# Patient Record
Sex: FEMALE | Race: WHITE | NOT HISPANIC OR LATINO | URBAN - METROPOLITAN AREA
[De-identification: names, ages, dates, MRNs, and addresses within clinical notes are randomized per-mention and may not be internally consistent; named-entity substitution may affect disease eponyms.]

---

## 2019-04-08 PROBLEM — 39621005 DISORDER OF GALLBLADDER: Status: ACTIVE | Noted: 2019-04-08

## 2019-07-24 PROBLEM — 70153002 HEMORRHOIDS: Status: ACTIVE | Noted: 2019-07-24

## 2019-08-07 PROBLEM — 75694006 PANCREATITIS: Status: ACTIVE | Noted: 2019-08-07

## 2021-07-12 ENCOUNTER — OFFICE VISIT (OUTPATIENT)
Dept: URBAN - METROPOLITAN AREA CLINIC 13 | Facility: CLINIC | Age: 66
End: 2021-07-12

## 2021-08-28 ENCOUNTER — TELEPHONE ENCOUNTER (OUTPATIENT)
Dept: URBAN - METROPOLITAN AREA CLINIC 13 | Facility: CLINIC | Age: 66
End: 2021-08-28

## 2021-08-28 RX ORDER — POLYETHYLENE GLYCOL 3350, SODIUM SULFATE, SODIUM CHLORIDE, POTASSIUM CHLORIDE, ASCORBIC ACID, SODIUM ASCORBATE 140-9-5.2G
KIT ORAL
OUTPATIENT
Start: 2019-07-24 | End: 2021-07-12

## 2021-08-29 ENCOUNTER — TELEPHONE ENCOUNTER (OUTPATIENT)
Dept: URBAN - METROPOLITAN AREA CLINIC 13 | Facility: CLINIC | Age: 66
End: 2021-08-29

## 2021-08-29 RX ORDER — ACETAMINOPHEN 500 MG/1
TABLET, FILM COATED ORAL
Status: ACTIVE | COMMUNITY

## 2021-08-29 RX ORDER — CETIRIZINE HYDROCHLORIDE 10 MG/1
TABLET, FILM COATED ORAL
Status: ACTIVE | COMMUNITY

## 2021-09-15 ENCOUNTER — OFFICE VISIT (OUTPATIENT)
Dept: URBAN - METROPOLITAN AREA MEDICAL CENTER 35 | Facility: MEDICAL CENTER | Age: 66
End: 2021-09-15

## 2021-11-10 ENCOUNTER — OFFICE VISIT (OUTPATIENT)
Dept: URBAN - METROPOLITAN AREA MEDICAL CENTER 35 | Facility: MEDICAL CENTER | Age: 66
End: 2021-11-10

## 2022-10-25 ENCOUNTER — OFFICE VISIT (OUTPATIENT)
Dept: URBAN - METROPOLITAN AREA CLINIC 48 | Facility: CLINIC | Age: 67
End: 2022-10-25

## 2022-10-25 RX ORDER — OMEPRAZOLE 10 MG/1
TAKE 1 CAPSULE (10 MG TOTAL) BY MOUTH IN THE MORNING CAPSULE, DELAYED RELEASE ORAL
Qty: 30 EACH | Refills: 0 | Status: ACTIVE | COMMUNITY

## 2022-10-25 RX ORDER — SUCRALFATE 1 G/1
TAKE 1 TABLET (1 G TOTAL) BY MOUTH 4 (FOUR) TIMES DAILY BEFORE MEALS AND NIGHTLY FOR 14 DAYS TABLET ORAL
Qty: 56 EACH | Refills: 0 | Status: ACTIVE | COMMUNITY

## 2022-11-21 ENCOUNTER — OFFICE VISIT (OUTPATIENT)
Dept: URBAN - METROPOLITAN AREA CLINIC 48 | Facility: CLINIC | Age: 67
End: 2022-11-21
Payer: MEDICARE

## 2022-11-21 ENCOUNTER — LAB OUTSIDE AN ENCOUNTER (OUTPATIENT)
Dept: URBAN - METROPOLITAN AREA CLINIC 48 | Facility: CLINIC | Age: 67
End: 2022-11-21

## 2022-11-21 ENCOUNTER — DASHBOARD ENCOUNTERS (OUTPATIENT)
Age: 67
End: 2022-11-21

## 2022-11-21 VITALS
DIASTOLIC BLOOD PRESSURE: 76 MMHG | OXYGEN SATURATION: 93 % | HEART RATE: 86 BPM | SYSTOLIC BLOOD PRESSURE: 143 MMHG | TEMPERATURE: 97.5 F | WEIGHT: 249.4 LBS | BODY MASS INDEX: 42.58 KG/M2 | HEIGHT: 64 IN

## 2022-11-21 DIAGNOSIS — R19.04 LEFT LOWER QUADRANT ABDOMINAL MASS: ICD-10-CM

## 2022-11-21 DIAGNOSIS — Z86.010 PERSONAL HISTORY OF COLONIC POLYPS: ICD-10-CM

## 2022-11-21 DIAGNOSIS — R10.84 ABDOMINAL CRAMPING, GENERALIZED: ICD-10-CM

## 2022-11-21 DIAGNOSIS — R63.4 UNINTENTIONAL WEIGHT LOSS: ICD-10-CM

## 2022-11-21 DIAGNOSIS — R19.4 CHANGE IN BOWEL HABIT: ICD-10-CM

## 2022-11-21 PROBLEM — 129851009: Status: ACTIVE | Noted: 2022-11-21

## 2022-11-21 PROBLEM — 827121001: Status: ACTIVE | Noted: 2022-11-21

## 2022-11-21 PROBLEM — 285388000: Status: ACTIVE | Noted: 2022-11-21

## 2022-11-21 PROBLEM — 448765001: Status: ACTIVE | Noted: 2022-11-21

## 2022-11-21 PROBLEM — 724556004: Status: ACTIVE | Noted: 2022-11-21

## 2022-11-21 PROCEDURE — 99204 OFFICE O/P NEW MOD 45 MIN: CPT | Performed by: NURSE PRACTITIONER

## 2022-11-21 RX ORDER — CHOLESTYRAMINE 4 G/9G
1 PACKET MIXED WITH WATER OR NON-CARBONATED DRINK. TAKE 2 HOURS AWAY FROM OTHER MEDICATION POWDER, FOR SUSPENSION ORAL ONCE A DAY
Qty: 60 | Refills: 6 | OUTPATIENT

## 2022-11-21 RX ORDER — OMEPRAZOLE 10 MG/1
TAKE 1 CAPSULE (10 MG TOTAL) BY MOUTH IN THE MORNING CAPSULE, DELAYED RELEASE ORAL
Qty: 30 EACH | Refills: 0 | Status: ACTIVE | COMMUNITY

## 2022-11-21 RX ORDER — CETIRIZINE HYDROCHLORIDE 10 MG/1
TABLET, FILM COATED ORAL
Status: ACTIVE | COMMUNITY

## 2022-11-21 RX ORDER — COLESTIPOL HYDROCHLORIDE 1 G/1
2 TABLETS. TAKE 2 HOURS AWAY FROM OTHER MEDICATIONS TABLET, FILM COATED ORAL ONCE A DAY
Qty: 60 | Refills: 6 | OUTPATIENT

## 2022-11-21 RX ORDER — SUCRALFATE 1 G/1
TAKE 1 TABLET (1 G TOTAL) BY MOUTH 4 (FOUR) TIMES DAILY BEFORE MEALS AND NIGHTLY FOR 14 DAYS TABLET ORAL
Qty: 56 EACH | Refills: 0 | Status: ACTIVE | COMMUNITY

## 2022-11-21 RX ORDER — ACETAMINOPHEN 500 MG/1
TABLET, FILM COATED ORAL
Status: ACTIVE | COMMUNITY

## 2022-11-21 NOTE — PHYSICAL EXAM SKIN:
no rashes , no suspicious lesions ,erythema to lower extremities with quarter size open wound with exudate, no jaundice present , good turgor , no masses , no tenderness on palpation

## 2022-11-21 NOTE — HPI-TODAY'S VISIT:
67 year old female presents for evaluation of diarrhea and abdominal masses. 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 Bowel movements are 4-5 times a day of loose, oily stool with occasional mucus. Denies blood in stool. 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 The patient has a large left lower abdomen mass and a right mid abdominal mass.  Reports 60 lb weight loss unintentionally in the last year. She has a has hx of BRENDA and used to take iron but has not been on it recently. CBC 6/2021 was unremarkable.

## 2022-11-21 NOTE — PHYSICAL EXAM CONSTITUTIONAL:
well developed, well nourished obese, in no acute distress , ambulating without difficulty using cane , normal communication ability

## 2022-11-21 NOTE — PHYSICAL EXAM GASTROINTESTINAL
Abdomen , soft, lef lower quadrant tenderness, nondistended , no guarding or rigidity , mobile/soft left of umbilicus mass and mobile/soft right of umbilicus mass, normal bowel sounds, borborgymi, Liver and Spleen , no hepatomegaly present , no hepatosplenomegaly , liver nontender , spleen not palpable

## 2022-11-22 PROBLEM — 428283002: Status: ACTIVE | Noted: 2022-11-21

## 2022-12-12 ENCOUNTER — TELEPHONE ENCOUNTER (OUTPATIENT)
Dept: URBAN - METROPOLITAN AREA CLINIC 44 | Facility: CLINIC | Age: 67
End: 2022-12-12

## 2022-12-12 ENCOUNTER — LAB OUTSIDE AN ENCOUNTER (OUTPATIENT)
Dept: URBAN - METROPOLITAN AREA CLINIC 44 | Facility: CLINIC | Age: 67
End: 2022-12-12

## 2022-12-14 ENCOUNTER — TELEPHONE ENCOUNTER (OUTPATIENT)
Dept: URBAN - METROPOLITAN AREA CLINIC 44 | Facility: CLINIC | Age: 67
End: 2022-12-14

## 2022-12-14 ENCOUNTER — CLAIMS CREATED FROM THE CLAIM WINDOW (OUTPATIENT)
Dept: URBAN - METROPOLITAN AREA CLINIC 4 | Facility: CLINIC | Age: 67
End: 2022-12-14
Payer: MEDICARE

## 2022-12-14 ENCOUNTER — OFFICE VISIT (OUTPATIENT)
Dept: URBAN - METROPOLITAN AREA SURGERY CENTER 28 | Facility: SURGERY CENTER | Age: 67
End: 2022-12-14
Payer: MEDICARE

## 2022-12-14 DIAGNOSIS — K22.719 BARRETT'S ESOPHAGUS WITH DYSPLASIA: ICD-10-CM

## 2022-12-14 DIAGNOSIS — K56.690 OTHER PARTIAL INTESTINAL OBSTRUCTION: ICD-10-CM

## 2022-12-14 DIAGNOSIS — C20 ADENOCARCINOMA OF RECTAL AMPULLA: ICD-10-CM

## 2022-12-14 DIAGNOSIS — R63.4 ABNORMAL INTENTIONAL WEIGHT LOSS: ICD-10-CM

## 2022-12-14 DIAGNOSIS — K26.9 CHILDHOOD DUODENAL ULCER: ICD-10-CM

## 2022-12-14 DIAGNOSIS — K22.719 BARRETT'S ESOPHAGUS WITH DYSPLASIA, UNSPECIFIED: ICD-10-CM

## 2022-12-14 DIAGNOSIS — K31.89 ACQUIRED DEFORMITY OF DUODENUM: ICD-10-CM

## 2022-12-14 PROCEDURE — 43239 EGD BIOPSY SINGLE/MULTIPLE: CPT | Performed by: INTERNAL MEDICINE

## 2022-12-14 PROCEDURE — 45380 COLONOSCOPY AND BIOPSY: CPT | Performed by: INTERNAL MEDICINE

## 2022-12-14 PROCEDURE — G8907 PT DOC NO EVENTS ON DISCHARG: HCPCS | Performed by: INTERNAL MEDICINE

## 2022-12-14 PROCEDURE — 88305 TISSUE EXAM BY PATHOLOGIST: CPT | Performed by: PATHOLOGY

## 2022-12-14 PROCEDURE — 45381 COLONOSCOPY SUBMUCOUS NJX: CPT | Performed by: INTERNAL MEDICINE

## 2022-12-14 RX ORDER — CETIRIZINE HYDROCHLORIDE 10 MG/1
TABLET, FILM COATED ORAL
Status: ACTIVE | COMMUNITY

## 2022-12-14 RX ORDER — COLESTIPOL HYDROCHLORIDE 1 G/1
2 TABLETS. TAKE 2 HOURS AWAY FROM OTHER MEDICATIONS TABLET, FILM COATED ORAL ONCE A DAY
Qty: 60 | Refills: 6 | Status: ACTIVE | COMMUNITY

## 2022-12-14 RX ORDER — ACETAMINOPHEN 500 MG/1
TABLET, FILM COATED ORAL
Status: ACTIVE | COMMUNITY

## 2022-12-14 RX ORDER — OMEPRAZOLE 10 MG/1
TAKE 1 CAPSULE (10 MG TOTAL) BY MOUTH IN THE MORNING CAPSULE, DELAYED RELEASE ORAL
Qty: 30 EACH | Refills: 0 | Status: ACTIVE | COMMUNITY

## 2022-12-14 RX ORDER — CHOLESTYRAMINE 4 G/9G
1 PACKET MIXED WITH WATER OR NON-CARBONATED DRINK. TAKE 2 HOURS AWAY FROM OTHER MEDICATION POWDER, FOR SUSPENSION ORAL ONCE A DAY
Qty: 60 | Refills: 6 | Status: ACTIVE | COMMUNITY

## 2022-12-14 RX ORDER — SUCRALFATE 1 G/1
TAKE 1 TABLET (1 G TOTAL) BY MOUTH 4 (FOUR) TIMES DAILY BEFORE MEALS AND NIGHTLY FOR 14 DAYS TABLET ORAL
Qty: 56 EACH | Refills: 0 | Status: ACTIVE | COMMUNITY

## 2022-12-19 ENCOUNTER — TELEPHONE ENCOUNTER (OUTPATIENT)
Dept: URBAN - METROPOLITAN AREA CLINIC 44 | Facility: CLINIC | Age: 67
End: 2022-12-19

## 2022-12-22 ENCOUNTER — TELEPHONE ENCOUNTER (OUTPATIENT)
Dept: URBAN - METROPOLITAN AREA CLINIC 44 | Facility: CLINIC | Age: 67
End: 2022-12-22

## 2022-12-22 PROBLEM — 254582000: Status: ACTIVE | Noted: 2022-12-19
